# Patient Record
Sex: MALE | Race: WHITE | Employment: FULL TIME | URBAN - METROPOLITAN AREA
[De-identification: names, ages, dates, MRNs, and addresses within clinical notes are randomized per-mention and may not be internally consistent; named-entity substitution may affect disease eponyms.]

---

## 2017-09-26 ENCOUNTER — ANESTHESIA EVENT (OUTPATIENT)
Dept: OPERATING ROOM | Age: 37
End: 2017-09-26
Payer: COMMERCIAL

## 2017-09-26 RX ORDER — COVID-19 ANTIGEN TEST
KIT MISCELLANEOUS PRN
COMMUNITY

## 2017-09-26 RX ORDER — ALBUTEROL SULFATE 90 UG/1
2 AEROSOL, METERED RESPIRATORY (INHALATION) EVERY 6 HOURS PRN
COMMUNITY

## 2017-09-26 RX ORDER — IBUPROFEN 200 MG
400 TABLET ORAL EVERY 6 HOURS PRN
Status: ON HOLD | COMMUNITY
End: 2018-08-01 | Stop reason: HOSPADM

## 2017-09-27 ENCOUNTER — HOSPITAL ENCOUNTER (OUTPATIENT)
Age: 37
Setting detail: OUTPATIENT SURGERY
Discharge: HOME | End: 2017-09-27
Attending: OPHTHALMOLOGY | Admitting: OPHTHALMOLOGY
Payer: COMMERCIAL

## 2017-09-27 ENCOUNTER — ANESTHESIA (OUTPATIENT)
Dept: OPERATING ROOM | Age: 37
End: 2017-09-27
Payer: COMMERCIAL

## 2017-09-27 VITALS — TEMPERATURE: 98 F | DIASTOLIC BLOOD PRESSURE: 87 MMHG | OXYGEN SATURATION: 95 % | SYSTOLIC BLOOD PRESSURE: 141 MMHG

## 2017-09-27 VITALS
HEIGHT: 72 IN | HEART RATE: 88 BPM | TEMPERATURE: 97.3 F | DIASTOLIC BLOOD PRESSURE: 80 MMHG | SYSTOLIC BLOOD PRESSURE: 133 MMHG | WEIGHT: 260 LBS | BODY MASS INDEX: 35.21 KG/M2 | OXYGEN SATURATION: 94 % | RESPIRATION RATE: 18 BRPM

## 2017-09-27 LAB
EKG ATRIAL RATE: 64 BPM
EKG P AXIS: 37 DEGREES
EKG P-R INTERVAL: 172 MS
EKG Q-T INTERVAL: 370 MS
EKG QRS DURATION: 90 MS
EKG QTC CALCULATION (BAZETT): 381 MS
EKG R AXIS: 1 DEGREES
EKG T AXIS: 18 DEGREES
EKG VENTRICULAR RATE: 64 BPM

## 2017-09-27 PROCEDURE — 6370000000 HC RX 637 (ALT 250 FOR IP): Performed by: OPHTHALMOLOGY

## 2017-09-27 PROCEDURE — 6360000002 HC RX W HCPCS: Performed by: OPHTHALMOLOGY

## 2017-09-27 PROCEDURE — 6360000002 HC RX W HCPCS: Performed by: NURSE ANESTHETIST, CERTIFIED REGISTERED

## 2017-09-27 PROCEDURE — 7100000000 HC PACU RECOVERY - FIRST 15 MIN: Performed by: OPHTHALMOLOGY

## 2017-09-27 PROCEDURE — 3700000001 HC ADD 15 MINUTES (ANESTHESIA): Performed by: OPHTHALMOLOGY

## 2017-09-27 PROCEDURE — 2580000003 HC RX 258: Performed by: NURSE ANESTHETIST, CERTIFIED REGISTERED

## 2017-09-27 PROCEDURE — 7100000010 HC PHASE II RECOVERY - FIRST 15 MIN: Performed by: OPHTHALMOLOGY

## 2017-09-27 PROCEDURE — C1784 OCULAR DEV, INTRAOP, DET RET: HCPCS | Performed by: OPHTHALMOLOGY

## 2017-09-27 PROCEDURE — 3600000004 HC SURGERY LEVEL 4 BASE: Performed by: OPHTHALMOLOGY

## 2017-09-27 PROCEDURE — 3600000014 HC SURGERY LEVEL 4 ADDTL 15MIN: Performed by: OPHTHALMOLOGY

## 2017-09-27 PROCEDURE — 2580000003 HC RX 258: Performed by: ANESTHESIOLOGY

## 2017-09-27 PROCEDURE — 2780000010 HC IMPLANT OTHER: Performed by: OPHTHALMOLOGY

## 2017-09-27 PROCEDURE — 93005 ELECTROCARDIOGRAM TRACING: CPT

## 2017-09-27 PROCEDURE — 7100000001 HC PACU RECOVERY - ADDTL 15 MIN: Performed by: OPHTHALMOLOGY

## 2017-09-27 PROCEDURE — 7100000011 HC PHASE II RECOVERY - ADDTL 15 MIN: Performed by: OPHTHALMOLOGY

## 2017-09-27 PROCEDURE — 6370000000 HC RX 637 (ALT 250 FOR IP): Performed by: ANESTHESIOLOGY

## 2017-09-27 PROCEDURE — 2500000003 HC RX 250 WO HCPCS: Performed by: OPHTHALMOLOGY

## 2017-09-27 PROCEDURE — 3700000000 HC ANESTHESIA ATTENDED CARE: Performed by: OPHTHALMOLOGY

## 2017-09-27 PROCEDURE — 2500000003 HC RX 250 WO HCPCS: Performed by: NURSE ANESTHETIST, CERTIFIED REGISTERED

## 2017-09-27 DEVICE — KIT OPHTH 5ML PERFLUOROCARBON LIQ PROC PERFLUORON: Type: IMPLANTABLE DEVICE | Site: EYE | Status: FUNCTIONAL

## 2017-09-27 DEVICE — IMPL EYE SLV SCLERAL BCKL SIL 70 2.1MM: Type: IMPLANTABLE DEVICE | Site: EYE | Status: FUNCTIONAL

## 2017-09-27 DEVICE — STRIP SCLER W3.5XL125MM THK0.75MM SIL SLD STYL 41/S2970: Type: IMPLANTABLE DEVICE | Site: EYE | Status: FUNCTIONAL

## 2017-09-27 RX ORDER — ONDANSETRON 2 MG/ML
4 INJECTION INTRAMUSCULAR; INTRAVENOUS
Status: DISCONTINUED | OUTPATIENT
Start: 2017-09-27 | End: 2017-09-27 | Stop reason: HOSPADM

## 2017-09-27 RX ORDER — GLYCOPYRROLATE 0.2 MG/ML
INJECTION INTRAMUSCULAR; INTRAVENOUS PRN
Status: DISCONTINUED | OUTPATIENT
Start: 2017-09-27 | End: 2017-09-27 | Stop reason: SDUPTHER

## 2017-09-27 RX ORDER — SODIUM CHLORIDE, SODIUM LACTATE, POTASSIUM CHLORIDE, CALCIUM CHLORIDE 600; 310; 30; 20 MG/100ML; MG/100ML; MG/100ML; MG/100ML
INJECTION, SOLUTION INTRAVENOUS CONTINUOUS PRN
Status: DISCONTINUED | OUTPATIENT
Start: 2017-09-27 | End: 2017-09-27 | Stop reason: SDUPTHER

## 2017-09-27 RX ORDER — FENTANYL CITRATE 50 UG/ML
INJECTION, SOLUTION INTRAMUSCULAR; INTRAVENOUS PRN
Status: DISCONTINUED | OUTPATIENT
Start: 2017-09-27 | End: 2017-09-27 | Stop reason: SDUPTHER

## 2017-09-27 RX ORDER — BUPIVACAINE HYDROCHLORIDE 7.5 MG/ML
INJECTION, SOLUTION EPIDURAL; RETROBULBAR PRN
Status: DISCONTINUED | OUTPATIENT
Start: 2017-09-27 | End: 2017-09-27 | Stop reason: HOSPADM

## 2017-09-27 RX ORDER — CEFAZOLIN SODIUM 500 MG/2.2ML
INJECTION, POWDER, FOR SOLUTION INTRAMUSCULAR; INTRAVENOUS PRN
Status: DISCONTINUED | OUTPATIENT
Start: 2017-09-27 | End: 2017-09-27 | Stop reason: HOSPADM

## 2017-09-27 RX ORDER — IBUPROFEN 600 MG/1
600 TABLET ORAL
Status: COMPLETED | OUTPATIENT
Start: 2017-09-27 | End: 2017-09-27

## 2017-09-27 RX ORDER — ONDANSETRON 2 MG/ML
INJECTION INTRAMUSCULAR; INTRAVENOUS PRN
Status: DISCONTINUED | OUTPATIENT
Start: 2017-09-27 | End: 2017-09-27 | Stop reason: SDUPTHER

## 2017-09-27 RX ORDER — ROCURONIUM BROMIDE 10 MG/ML
INJECTION, SOLUTION INTRAVENOUS PRN
Status: DISCONTINUED | OUTPATIENT
Start: 2017-09-27 | End: 2017-09-27 | Stop reason: SDUPTHER

## 2017-09-27 RX ORDER — PROPOFOL 10 MG/ML
INJECTION, EMULSION INTRAVENOUS PRN
Status: DISCONTINUED | OUTPATIENT
Start: 2017-09-27 | End: 2017-09-27 | Stop reason: SDUPTHER

## 2017-09-27 RX ORDER — CYCLOPENTOLATE HYDROCHLORIDE 10 MG/ML
1 SOLUTION/ DROPS OPHTHALMIC EVERY 5 MIN PRN
Status: COMPLETED | OUTPATIENT
Start: 2017-09-27 | End: 2017-09-27

## 2017-09-27 RX ORDER — DEXAMETHASONE SODIUM PHOSPHATE 10 MG/ML
INJECTION INTRAMUSCULAR; INTRAVENOUS PRN
Status: DISCONTINUED | OUTPATIENT
Start: 2017-09-27 | End: 2017-09-27 | Stop reason: SDUPTHER

## 2017-09-27 RX ORDER — GENTAMICIN SULFATE 3 MG/ML
SOLUTION/ DROPS OPHTHALMIC PRN
Status: DISCONTINUED | OUTPATIENT
Start: 2017-09-27 | End: 2017-09-27 | Stop reason: HOSPADM

## 2017-09-27 RX ORDER — CYCLOPENTOLATE HYDROCHLORIDE 10 MG/ML
SOLUTION/ DROPS OPHTHALMIC PRN
Status: DISCONTINUED | OUTPATIENT
Start: 2017-09-27 | End: 2017-09-27 | Stop reason: HOSPADM

## 2017-09-27 RX ORDER — NEOSTIGMINE METHYLSULFATE 1 MG/ML
INJECTION, SOLUTION INTRAVENOUS PRN
Status: DISCONTINUED | OUTPATIENT
Start: 2017-09-27 | End: 2017-09-27 | Stop reason: SDUPTHER

## 2017-09-27 RX ORDER — BALANCED SALT SOLUTION ENRICHED WITH BICARBONATE, DEXTROSE, AND GLUTATHIONE
KIT INTRAOCULAR PRN
Status: DISCONTINUED | OUTPATIENT
Start: 2017-09-27 | End: 2017-09-27 | Stop reason: HOSPADM

## 2017-09-27 RX ORDER — DEXAMETHASONE SODIUM PHOSPHATE 4 MG/ML
INJECTION, SOLUTION INTRA-ARTICULAR; INTRALESIONAL; INTRAMUSCULAR; INTRAVENOUS; SOFT TISSUE PRN
Status: DISCONTINUED | OUTPATIENT
Start: 2017-09-27 | End: 2017-09-27 | Stop reason: HOSPADM

## 2017-09-27 RX ORDER — PROMETHAZINE HYDROCHLORIDE 25 MG/ML
6.25 INJECTION, SOLUTION INTRAMUSCULAR; INTRAVENOUS
Status: DISCONTINUED | OUTPATIENT
Start: 2017-09-27 | End: 2017-09-27 | Stop reason: HOSPADM

## 2017-09-27 RX ORDER — SODIUM CHLORIDE, SODIUM LACTATE, POTASSIUM CHLORIDE, CALCIUM CHLORIDE 600; 310; 30; 20 MG/100ML; MG/100ML; MG/100ML; MG/100ML
INJECTION, SOLUTION INTRAVENOUS CONTINUOUS
Status: DISCONTINUED | OUTPATIENT
Start: 2017-09-27 | End: 2017-09-27 | Stop reason: HOSPADM

## 2017-09-27 RX ORDER — LIDOCAINE HYDROCHLORIDE 10 MG/ML
1 INJECTION, SOLUTION EPIDURAL; INFILTRATION; INTRACAUDAL; PERINEURAL
Status: DISCONTINUED | OUTPATIENT
Start: 2017-09-27 | End: 2017-09-27 | Stop reason: HOSPADM

## 2017-09-27 RX ORDER — DIPHENHYDRAMINE HYDROCHLORIDE 50 MG/ML
12.5 INJECTION INTRAMUSCULAR; INTRAVENOUS
Status: DISCONTINUED | OUTPATIENT
Start: 2017-09-27 | End: 2017-09-27 | Stop reason: HOSPADM

## 2017-09-27 RX ORDER — PREDNISOLONE ACETATE 10 MG/ML
SUSPENSION/ DROPS OPHTHALMIC PRN
Status: DISCONTINUED | OUTPATIENT
Start: 2017-09-27 | End: 2017-09-27 | Stop reason: HOSPADM

## 2017-09-27 RX ORDER — LIDOCAINE HYDROCHLORIDE 20 MG/ML
INJECTION, SOLUTION INFILTRATION; PERINEURAL PRN
Status: DISCONTINUED | OUTPATIENT
Start: 2017-09-27 | End: 2017-09-27 | Stop reason: HOSPADM

## 2017-09-27 RX ORDER — LIDOCAINE HYDROCHLORIDE 10 MG/ML
INJECTION, SOLUTION EPIDURAL; INFILTRATION; INTRACAUDAL; PERINEURAL PRN
Status: DISCONTINUED | OUTPATIENT
Start: 2017-09-27 | End: 2017-09-27 | Stop reason: SDUPTHER

## 2017-09-27 RX ORDER — BRIMONIDINE TARTRATE 0.15 %
DROPS OPHTHALMIC (EYE) PRN
Status: DISCONTINUED | OUTPATIENT
Start: 2017-09-27 | End: 2017-09-27 | Stop reason: HOSPADM

## 2017-09-27 RX ORDER — ATROPINE SULFATE 10 MG/ML
1 SOLUTION/ DROPS OPHTHALMIC ONCE
Status: COMPLETED | OUTPATIENT
Start: 2017-09-27 | End: 2017-09-27

## 2017-09-27 RX ADMIN — LIDOCAINE HYDROCHLORIDE 100 MG: 10 INJECTION, SOLUTION EPIDURAL; INFILTRATION; INTRACAUDAL; PERINEURAL at 12:15

## 2017-09-27 RX ADMIN — SODIUM CHLORIDE, POTASSIUM CHLORIDE, SODIUM LACTATE AND CALCIUM CHLORIDE: 600; 310; 30; 20 INJECTION, SOLUTION INTRAVENOUS at 12:00

## 2017-09-27 RX ADMIN — CYCLOPENTOLATE HYDROCHLORIDE 1 DROP: 10 SOLUTION/ DROPS OPHTHALMIC at 08:39

## 2017-09-27 RX ADMIN — CYCLOPENTOLATE HYDROCHLORIDE 1 DROP: 10 SOLUTION/ DROPS OPHTHALMIC at 08:25

## 2017-09-27 RX ADMIN — IBUPROFEN 600 MG: 600 TABLET, FILM COATED ORAL at 13:37

## 2017-09-27 RX ADMIN — GLYCOPYRROLATE 0.2 MG: 0.2 INJECTION, SOLUTION INTRAMUSCULAR; INTRAVENOUS at 09:32

## 2017-09-27 RX ADMIN — FENTANYL CITRATE 50 MCG: 50 INJECTION INTRAMUSCULAR; INTRAVENOUS at 09:25

## 2017-09-27 RX ADMIN — ROCURONIUM BROMIDE 20 MG: 50 INJECTION, SOLUTION INTRAVENOUS at 11:15

## 2017-09-27 RX ADMIN — GENTAMICIN, PREDNISOLONE ACETATE 1 DROP: 3; 10 SUSPENSION/ DROPS OPHTHALMIC at 08:39

## 2017-09-27 RX ADMIN — CYCLOPENTOLATE HYDROCHLORIDE 1 DROP: 10 SOLUTION/ DROPS OPHTHALMIC at 08:32

## 2017-09-27 RX ADMIN — LIDOCAINE HYDROCHLORIDE 50 MG: 10 INJECTION, SOLUTION EPIDURAL; INFILTRATION; INTRACAUDAL; PERINEURAL at 09:27

## 2017-09-27 RX ADMIN — ONDANSETRON 4 MG: 2 INJECTION INTRAMUSCULAR; INTRAVENOUS at 11:56

## 2017-09-27 RX ADMIN — PROPOFOL 300 MG: 10 INJECTION, EMULSION INTRAVENOUS at 09:27

## 2017-09-27 RX ADMIN — SODIUM CHLORIDE, POTASSIUM CHLORIDE, SODIUM LACTATE AND CALCIUM CHLORIDE: 600; 310; 30; 20 INJECTION, SOLUTION INTRAVENOUS at 08:45

## 2017-09-27 RX ADMIN — GLYCOPYRROLATE 0.4 MG: 0.2 INJECTION, SOLUTION INTRAMUSCULAR; INTRAVENOUS at 12:15

## 2017-09-27 RX ADMIN — ATROPINE SULFATE 1 DROP: 10 SOLUTION/ DROPS OPHTHALMIC at 08:25

## 2017-09-27 RX ADMIN — ROCURONIUM BROMIDE 50 MG: 50 INJECTION, SOLUTION INTRAVENOUS at 09:28

## 2017-09-27 RX ADMIN — DEXAMETHASONE SODIUM PHOSPHATE 10 MG: 10 INJECTION INTRAMUSCULAR; INTRAVENOUS at 09:35

## 2017-09-27 RX ADMIN — NEOSTIGMINE METHYLSULFATE 2 MG: 1 INJECTION INTRAVENOUS at 12:15

## 2017-09-27 RX ADMIN — FENTANYL CITRATE 50 MCG: 50 INJECTION INTRAMUSCULAR; INTRAVENOUS at 09:40

## 2017-09-27 RX ADMIN — ROCURONIUM BROMIDE 20 MG: 50 INJECTION, SOLUTION INTRAVENOUS at 09:59

## 2017-09-27 ASSESSMENT — PAIN - FUNCTIONAL ASSESSMENT: PAIN_FUNCTIONAL_ASSESSMENT: 0-10

## 2017-09-27 ASSESSMENT — PAIN SCALES - GENERAL
PAINLEVEL_OUTOF10: 0
PAINLEVEL_OUTOF10: 6

## 2017-09-27 ASSESSMENT — ENCOUNTER SYMPTOMS: SHORTNESS OF BREATH: 0

## 2017-09-27 NOTE — IP AVS SNAPSHOT
Patient Information     Patient Name SAIMA Guan [place] 1980         This is your updated medication list to keep with you all times      TAKE these medications     albuterol sulfate  (90 Base) MCG/ACT inhaler       ALEVE 220 MG Caps   Generic drug:  Naproxen Sodium       ibuprofen 200 MG tablet   Commonly known as:  ADVIL;MOTRIN

## 2017-09-27 NOTE — BRIEF OP NOTE
Brief Postoperative Note  ______________________________________________________________    Patient: Mihaela Monsalve  YOB: 1980  MRN: [id number]  Date of Procedure: 9/27/2017    Pre-Op Diagnosis: RETINAL DETACHMENT LEFT EYE     Post-Op Diagnosis: Same       Procedure(s):  VITRECTOMY 23 GAUGE, ENDOLASER 200MW 200MS 1285 SPOTS, SCLERAL BUCKLE, AIR FLUID AIR GAS EXCHANGE WITH 16 PERCENT C3F8, PFO    Anesthesia: General    Surgeon(s):  Erasmo Potter MD    Staff:  Scrub Person First: Cheryl Watts  Scrub Person Second: Alycia Wells RN     Estimated Blood Loss none    Complications: none    Specimens:   * No specimens in log *    Implants:    Implant Name Type Inv. Item Serial No.  Lot No. LRB No. Used   KIT PROC LIQ OCULAR PERFLOURON 5ML US Eye KIT PROC LIQ OCULAR PERFLOURON 5ML US  ISABEL  Left 1   COMPONENT SCLERAL BUCKLE  STYLE 41 Eye COMPONENT SCLERAL BUCKLE  STYLE 41  [place] OPTHALMIC [place]  Left 1   IMPL EYE SLV SCLERAL BCKL ALLEN 70 2. [address] BCKL ALLEN 70 2.1MM   [address] [facility]   Left 1             Erasmo Potter MD  Date: 9/27/2017  Time: 12:24 PM

## 2017-09-27 NOTE — IP AVS SNAPSHOT
After Visit Summary  (Discharge Instructions)    Medication List for Home    Based on the information you provided to us as well as any changes during this visit, the following is your updated medication list.  Compare this with your prescription bottles at home. If you have any questions or concerns, contact your primary care physician's office. Daily Medication List (This medication list can be shared with any healthcare provider who is helping you manage your medications)      These are medications you told us you were taking at home, CONTINUE taking them after you leave the hospital        Last Dose    Next Dose Due AM NOON PM NIGHT    albuterol sulfate  (90 Base) MCG/ACT inhaler   Inhale 2 puffs into the lungs every 6 hours as needed for Wheezing                                         ALEVE 220 MG Caps   Generic drug:  Naproxen Sodium   Take by mouth as needed for Pain                                         ibuprofen 200 MG tablet   Commonly known as:  ADVIL;MOTRIN   Take 400 mg by mouth every 6 hours as needed for Pain                                                 Allergies as of 9/27/2017     No Known Allergies      Immunizations as of 9/27/2017     No immunizations on file. Last Vitals          Most Recent Value    Temp      Pulse  68    Resp  16    BP  (!)  151/92         After Visit Summary    This summary was created for you. Thank you for entrusting your care to us.   The following information includes details about your hospital/visit stay along with steps you should take to help with your recovery once you leave the hospital.  In this packet, you will find information about the topics listed below:    · Instructions about your medications including a list of your home medications  · A summary of your hospital visit  · Follow-up appointments once you have left the hospital  · Your care plan at home changed, so think of one that is secure and easy to remember. 6. Create a [employer] password. You can change your password at any time. 7. Enter your Password Reset Question and Answer. This can be used at a later time if you forget your password. 8. Enter your e-mail address. You will receive e-mail notification when new information is available in [address] 9. Click Sign Up. You can now view your medical record. Additional Information  If you have questions, please contact the physician practice where you receive care. Remember, [employer] is NOT to be used for urgent needs. For medical emergencies, dial 911. For questions regarding your [employer] account call [phone]. If you have a clinical question, please call your doctor's office. View your information online  ? Review your current list of  medications, immunization, and allergies. ? Review your future test results online . ? Review your discharge instructions provided by your caregivers at discharge    Certain functionality such as prescription refills, scheduling appointments or sending messages to your provider are not activated if your provider does not use [employer] in his/her office    For questions regarding your [employer] account call [phone]. If you have a clinical question, please call your doctor's office. The information on all pages of the After Visit Summary has been reviewed with me, the patient and/or responsible adult, by my health care provider(s). I had the opportunity to ask questions regarding this information. I understand I should dispose of my armband safely at home to protect my health information. A complete copy of the After Visit Summary has been given to me, the patient and/or responsible adult.            Patient Signature/Responsible Adult:____________________    Clinician Signature:_____________________    Date:_____________________    Time:_____________________

## 2017-10-02 NOTE — OP NOTE
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 The attending surgeon, Dr. Joe Chan, identified the patient in the preoperative holding area, and the appropriate operative site was marked. Patient was then brought back to the operating room. Verbal time-out was called and subsequently general anesthesia was induced. The patient was then prepped and draped in the usual ophthalmic fashion. A lid speculum was placed into the surgical eye. A 360-degree conjunctival peritomy was performed with Vicenta scissors. Blunt dissection was carried out in each of the oblique quadrants with curved Montague scissors. Each of the rectus muscles were isolated with muscle hooks and subsequently isolated with 2-0 silk ligatures. At this time, the sclera was inspected and no areas of scleral thinning were visualized. At this time, the 41 encircling silicone band was brought onto the field. It had been previously soaked in Ancef and was looped under each of the rectus muscles. It was fastened in the superonasal quadrant with the Watzke sleeve. The 5-0 nylon sutures were used to secure the band in each of the oblique quadrants in the typical horizontal mattress fashion. The anterior bite of the nylon suture was placed 3.5 posterior to the muscle insertion. The band was then brought up to an appropriate level of tension. The superior excess of the encircling band was placed underneath the superior rectus muscle, and the inferior excess was placed underneath the band.

## 2018-08-01 ENCOUNTER — ANESTHESIA EVENT (OUTPATIENT)
Dept: OPERATING ROOM | Age: 38
End: 2018-08-01
Payer: COMMERCIAL

## 2018-08-01 ENCOUNTER — ANESTHESIA (OUTPATIENT)
Dept: OPERATING ROOM | Age: 38
End: 2018-08-01
Payer: COMMERCIAL

## 2018-08-01 ENCOUNTER — OFFICE VISIT (OUTPATIENT)
Dept: PRIMARY CARE CLINIC | Age: 38
End: 2018-08-01
Payer: COMMERCIAL

## 2018-08-01 ENCOUNTER — HOSPITAL ENCOUNTER (OUTPATIENT)
Age: 38
Discharge: HOME | End: 2018-08-03
Payer: COMMERCIAL

## 2018-08-01 ENCOUNTER — HOSPITAL ENCOUNTER (EMERGENCY)
Age: 38
Discharge: HOME | End: 2018-08-02
Attending: SPECIALIST | Admitting: SURGERY
Payer: COMMERCIAL

## 2018-08-01 ENCOUNTER — HOSPITAL ENCOUNTER (OUTPATIENT)
Dept: CT IMAGING | Age: 38
Discharge: HOME | End: 2018-08-03
Payer: COMMERCIAL

## 2018-08-01 ENCOUNTER — HOSPITAL ENCOUNTER (OUTPATIENT)
Age: 38
Setting detail: SPECIMEN
Discharge: HOME | End: 2018-08-01
Payer: COMMERCIAL

## 2018-08-01 VITALS
RESPIRATION RATE: 17 BRPM | OXYGEN SATURATION: 99 % | SYSTOLIC BLOOD PRESSURE: 108 MMHG | TEMPERATURE: 98 F | DIASTOLIC BLOOD PRESSURE: 62 MMHG

## 2018-08-01 VITALS
WEIGHT: 264 LBS | HEART RATE: 74 BPM | DIASTOLIC BLOOD PRESSURE: 74 MMHG | SYSTOLIC BLOOD PRESSURE: 132 MMHG | TEMPERATURE: 98 F | OXYGEN SATURATION: 98 % | BODY MASS INDEX: 35.8 KG/M2

## 2018-08-01 DIAGNOSIS — K35.30 ACUTE APPENDICITIS WITH LOCALIZED PERITONITIS: Primary | ICD-10-CM

## 2018-08-01 DIAGNOSIS — K35.80 ACUTE APPENDICITIS, UNSPECIFIED ACUTE APPENDICITIS TYPE: Primary | ICD-10-CM

## 2018-08-01 DIAGNOSIS — R10.84 GENERALIZED ABDOMINAL PAIN: ICD-10-CM

## 2018-08-01 DIAGNOSIS — R91.1 LUNG NODULE SEEN ON IMAGING STUDY: ICD-10-CM

## 2018-08-01 LAB
-: NORMAL
ABSOLUTE EOS #: 0.1 K/UL (ref 0–0.4)
ABSOLUTE IMMATURE GRANULOCYTE: ABNORMAL K/UL (ref 0–0.3)
ABSOLUTE LYMPH #: 2 K/UL (ref 1–4.8)
ABSOLUTE MONO #: 1.2 K/UL (ref 0.1–1.2)
ALBUMIN SERPL-MCNC: 4.5 G/DL (ref 3.5–5.2)
ALBUMIN/GLOBULIN RATIO: 1.4 (ref 1–2.5)
ALP BLD-CCNC: 61 U/L (ref 40–129)
ALT SERPL-CCNC: 39 U/L (ref 5–41)
AMORPHOUS: NORMAL
ANION GAP SERPL CALCULATED.3IONS-SCNC: 12 MMOL/L (ref 9–17)
AST SERPL-CCNC: 32 U/L
BACTERIA: NORMAL
BASOPHILS # BLD: 1 % (ref 0–2)
BASOPHILS ABSOLUTE: 0.1 K/UL (ref 0–0.2)
BILIRUB SERPL-MCNC: 0.29 MG/DL (ref 0.3–1.2)
BILIRUBIN URINE: NEGATIVE
BUN BLDV-MCNC: 17 MG/DL (ref 6–20)
BUN/CREAT BLD: 17 (ref 9–20)
CALCIUM SERPL-MCNC: 9.4 MG/DL (ref 8.6–10.4)
CASTS UA: NORMAL /LPF (ref 0–2)
CHLORIDE BLD-SCNC: 99 MMOL/L (ref 98–107)
CO2: 28 MMOL/L (ref 20–31)
COLOR: NORMAL
COMMENT UA: NORMAL
CREAT SERPL-MCNC: 1 MG/DL (ref 0.7–1.2)
CRYSTALS, UA: NORMAL /HPF
DIFFERENTIAL TYPE: ABNORMAL
EOSINOPHILS RELATIVE PERCENT: 1 % (ref 1–8)
EPITHELIAL CELLS UA: NORMAL /HPF (ref 0–5)
GFR AFRICAN AMERICAN: >60 ML/MIN
GFR NON-AFRICAN AMERICAN: >60 ML/MIN
GFR SERPL CREATININE-BSD FRML MDRD: ABNORMAL ML/MIN/{1.73_M2}
GFR SERPL CREATININE-BSD FRML MDRD: ABNORMAL ML/MIN/{1.73_M2}
GLUCOSE BLD-MCNC: 98 MG/DL (ref 70–99)
GLUCOSE URINE: NEGATIVE
HCT VFR BLD CALC: 42.9 % (ref 41–53)
HEMOGLOBIN: 14.8 G/DL (ref 13.5–17.5)
IMMATURE GRANULOCYTES: ABNORMAL %
KETONES, URINE: NEGATIVE
LEUKOCYTE ESTERASE, URINE: NEGATIVE
LYMPHOCYTES # BLD: 13 % (ref 15–43)
MCH RBC QN AUTO: 30.4 PG (ref 26–34)
MCHC RBC AUTO-ENTMCNC: 34.5 G/DL (ref 31–37)
MCV RBC AUTO: 88.2 FL (ref 80–100)
MONOCYTES # BLD: 8 % (ref 6–14)
MUCUS: NORMAL
NITRITE, URINE: NEGATIVE
NRBC AUTOMATED: ABNORMAL PER 100 WBC
OTHER OBSERVATIONS UA: NORMAL
PDW BLD-RTO: 13.9 % (ref 11–14.5)
PH UA: 6 (ref 5–6)
PLATELET # BLD: 214 K/UL (ref 140–450)
PLATELET ESTIMATE: ABNORMAL
PMV BLD AUTO: 8.6 FL (ref 6–12)
POTASSIUM SERPL-SCNC: 3.9 MMOL/L (ref 3.7–5.3)
PROTEIN UA: NEGATIVE
RBC # BLD: 4.86 M/UL (ref 4.5–5.9)
RBC # BLD: ABNORMAL 10*6/UL
RBC UA: NORMAL /HPF (ref 0–4)
RENAL EPITHELIAL, UA: NORMAL /HPF
SEG NEUTROPHILS: 77 % (ref 44–74)
SEGMENTED NEUTROPHILS ABSOLUTE COUNT: 11.5 K/UL (ref 1.8–7.7)
SODIUM BLD-SCNC: 139 MMOL/L (ref 135–144)
SPECIFIC GRAVITY UA: 1.02 (ref 1.01–1.02)
TOTAL PROTEIN: 7.8 G/DL (ref 6.4–8.3)
TRICHOMONAS: NORMAL
TURBIDITY: NORMAL
URINE HGB: NEGATIVE
UROBILINOGEN, URINE: NORMAL
WBC # BLD: 15 K/UL (ref 3.5–11)
WBC # BLD: ABNORMAL 10*3/UL
WBC UA: NORMAL /HPF (ref 0–4)
YEAST: NORMAL

## 2018-08-01 PROCEDURE — 2709999900 CT ABDOMEN PELVIS W IV CONTRAST

## 2018-08-01 PROCEDURE — 7100000000 HC PACU RECOVERY - FIRST 15 MIN: Performed by: SURGERY

## 2018-08-01 PROCEDURE — 00790 ANES IPER UPR ABD NOS: CPT | Performed by: NURSE ANESTHETIST, CERTIFIED REGISTERED

## 2018-08-01 PROCEDURE — 2500000003 HC RX 250 WO HCPCS: Performed by: SURGERY

## 2018-08-01 PROCEDURE — 99215 OFFICE O/P EST HI 40 MIN: CPT | Performed by: NURSE PRACTITIONER

## 2018-08-01 PROCEDURE — 2500000003 HC RX 250 WO HCPCS

## 2018-08-01 PROCEDURE — 3600000004 HC SURGERY LEVEL 4 BASE: Performed by: SURGERY

## 2018-08-01 PROCEDURE — 2780000010 HC IMPLANT OTHER: Performed by: SURGERY

## 2018-08-01 PROCEDURE — S0028 INJECTION, FAMOTIDINE, 20 MG: HCPCS

## 2018-08-01 PROCEDURE — 7100000001 HC PACU RECOVERY - ADDTL 15 MIN: Performed by: SURGERY

## 2018-08-01 PROCEDURE — 2580000003 HC RX 258: Performed by: NURSE ANESTHETIST, CERTIFIED REGISTERED

## 2018-08-01 PROCEDURE — 3700000001 HC ADD 15 MINUTES (ANESTHESIA): Performed by: SURGERY

## 2018-08-01 PROCEDURE — 36415 COLL VENOUS BLD VENIPUNCTURE: CPT

## 2018-08-01 PROCEDURE — 6360000004 HC RX CONTRAST MEDICATION: Performed by: NURSE PRACTITIONER

## 2018-08-01 PROCEDURE — 88342 IMHCHEM/IMCYTCHM 1ST ANTB: CPT

## 2018-08-01 PROCEDURE — 6360000002 HC RX W HCPCS

## 2018-08-01 PROCEDURE — 2709999900 HC NON-CHARGEABLE SUPPLY: Performed by: SURGERY

## 2018-08-01 PROCEDURE — 44970 LAPAROSCOPY APPENDECTOMY: CPT | Performed by: SURGERY

## 2018-08-01 PROCEDURE — 99284 EMERGENCY DEPT VISIT MOD MDM: CPT

## 2018-08-01 PROCEDURE — 2720000010 HC SURG SUPPLY STERILE: Performed by: SURGERY

## 2018-08-01 PROCEDURE — 2500000003 HC RX 250 WO HCPCS: Performed by: NURSE ANESTHETIST, CERTIFIED REGISTERED

## 2018-08-01 PROCEDURE — 3600000014 HC SURGERY LEVEL 4 ADDTL 15MIN: Performed by: SURGERY

## 2018-08-01 PROCEDURE — 81001 URINALYSIS AUTO W/SCOPE: CPT

## 2018-08-01 PROCEDURE — 88304 TISSUE EXAM BY PATHOLOGIST: CPT

## 2018-08-01 PROCEDURE — S0028 INJECTION, FAMOTIDINE, 20 MG: HCPCS | Performed by: NURSE ANESTHETIST, CERTIFIED REGISTERED

## 2018-08-01 PROCEDURE — 3700000000 HC ANESTHESIA ATTENDED CARE: Performed by: SURGERY

## 2018-08-01 PROCEDURE — 2580000003 HC RX 258: Performed by: EMERGENCY MEDICINE

## 2018-08-01 PROCEDURE — 6360000002 HC RX W HCPCS: Performed by: NURSE ANESTHETIST, CERTIFIED REGISTERED

## 2018-08-01 PROCEDURE — 85025 COMPLETE CBC W/AUTO DIFF WBC: CPT

## 2018-08-01 PROCEDURE — 80053 COMPREHEN METABOLIC PANEL: CPT

## 2018-08-01 PROCEDURE — 88341 IMHCHEM/IMCYTCHM EA ADD ANTB: CPT

## 2018-08-01 DEVICE — RELOAD STPL H1-2.5X45MM VASC THN TISS WHT 6 ROW B FRM SGL: Type: IMPLANTABLE DEVICE | Status: FUNCTIONAL

## 2018-08-01 DEVICE — RELOAD STPL SZ 0 L45MM DIA3.5MM 0DEG STD REG TISS BLU TI: Type: IMPLANTABLE DEVICE | Status: FUNCTIONAL

## 2018-08-01 RX ORDER — HYDROCODONE BITARTRATE AND ACETAMINOPHEN 5; 325 MG/1; MG/1
1 TABLET ORAL EVERY 6 HOURS PRN
Qty: 20 TABLET | Refills: 0 | Status: SHIPPED | OUTPATIENT
Start: 2018-08-01 | End: 2018-08-08

## 2018-08-01 RX ORDER — MORPHINE SULFATE 2 MG/ML
1 INJECTION, SOLUTION INTRAMUSCULAR; INTRAVENOUS EVERY 5 MIN PRN
Status: DISCONTINUED | OUTPATIENT
Start: 2018-08-01 | End: 2018-09-04 | Stop reason: HOSPADM

## 2018-08-01 RX ORDER — PROPOFOL 10 MG/ML
INJECTION, EMULSION INTRAVENOUS PRN
Status: DISCONTINUED | OUTPATIENT
Start: 2018-08-01 | End: 2018-08-01 | Stop reason: SDUPTHER

## 2018-08-01 RX ORDER — ROCURONIUM BROMIDE 10 MG/ML
INJECTION, SOLUTION INTRAVENOUS PRN
Status: DISCONTINUED | OUTPATIENT
Start: 2018-08-01 | End: 2018-08-01 | Stop reason: SDUPTHER

## 2018-08-01 RX ORDER — HYDROCODONE BITARTRATE AND ACETAMINOPHEN 5; 325 MG/1; MG/1
1 TABLET ORAL PRN
Status: ACTIVE | OUTPATIENT
Start: 2018-08-01 | End: 2018-08-01

## 2018-08-01 RX ORDER — CEFOXITIN 2 G/1
INJECTION, POWDER, FOR SOLUTION INTRAVENOUS PRN
Status: DISCONTINUED | OUTPATIENT
Start: 2018-08-01 | End: 2018-08-01 | Stop reason: SDUPTHER

## 2018-08-01 RX ORDER — MORPHINE SULFATE 2 MG/ML
2 INJECTION, SOLUTION INTRAMUSCULAR; INTRAVENOUS EVERY 5 MIN PRN
Status: DISCONTINUED | OUTPATIENT
Start: 2018-08-01 | End: 2018-09-04 | Stop reason: HOSPADM

## 2018-08-01 RX ORDER — LIDOCAINE HYDROCHLORIDE 20 MG/ML
INJECTION, SOLUTION INFILTRATION; PERINEURAL PRN
Status: DISCONTINUED | OUTPATIENT
Start: 2018-08-01 | End: 2018-08-01 | Stop reason: SDUPTHER

## 2018-08-01 RX ORDER — MIDAZOLAM HYDROCHLORIDE 1 MG/ML
INJECTION INTRAMUSCULAR; INTRAVENOUS PRN
Status: DISCONTINUED | OUTPATIENT
Start: 2018-08-01 | End: 2018-08-01 | Stop reason: SDUPTHER

## 2018-08-01 RX ORDER — SUCCINYLCHOLINE CHLORIDE 20 MG/ML
INJECTION INTRAMUSCULAR; INTRAVENOUS PRN
Status: DISCONTINUED | OUTPATIENT
Start: 2018-08-01 | End: 2018-08-01 | Stop reason: SDUPTHER

## 2018-08-01 RX ORDER — HYDROCODONE BITARTRATE AND ACETAMINOPHEN 5; 325 MG/1; MG/1
2 TABLET ORAL PRN
Status: ACTIVE | OUTPATIENT
Start: 2018-08-01 | End: 2018-08-01

## 2018-08-01 RX ORDER — METOCLOPRAMIDE HYDROCHLORIDE 5 MG/ML
INJECTION INTRAMUSCULAR; INTRAVENOUS PRN
Status: DISCONTINUED | OUTPATIENT
Start: 2018-08-01 | End: 2018-08-01 | Stop reason: SDUPTHER

## 2018-08-01 RX ORDER — DEXAMETHASONE SODIUM PHOSPHATE 10 MG/ML
INJECTION INTRAMUSCULAR; INTRAVENOUS PRN
Status: DISCONTINUED | OUTPATIENT
Start: 2018-08-01 | End: 2018-08-01 | Stop reason: SDUPTHER

## 2018-08-01 RX ORDER — KETOROLAC TROMETHAMINE 30 MG/ML
INJECTION, SOLUTION INTRAMUSCULAR; INTRAVENOUS PRN
Status: DISCONTINUED | OUTPATIENT
Start: 2018-08-01 | End: 2018-08-01 | Stop reason: SDUPTHER

## 2018-08-01 RX ORDER — SODIUM CHLORIDE 9 MG/ML
INJECTION, SOLUTION INTRAVENOUS CONTINUOUS
Status: DISCONTINUED | OUTPATIENT
Start: 2018-08-01 | End: 2018-09-04 | Stop reason: HOSPADM

## 2018-08-01 RX ORDER — FENTANYL CITRATE 50 UG/ML
INJECTION, SOLUTION INTRAMUSCULAR; INTRAVENOUS PRN
Status: DISCONTINUED | OUTPATIENT
Start: 2018-08-01 | End: 2018-08-01 | Stop reason: SDUPTHER

## 2018-08-01 RX ORDER — ONDANSETRON 2 MG/ML
4 INJECTION INTRAMUSCULAR; INTRAVENOUS
Status: ACTIVE | OUTPATIENT
Start: 2018-08-01 | End: 2018-08-01

## 2018-08-01 RX ORDER — SODIUM CHLORIDE, SODIUM LACTATE, POTASSIUM CHLORIDE, CALCIUM CHLORIDE 600; 310; 30; 20 MG/100ML; MG/100ML; MG/100ML; MG/100ML
INJECTION, SOLUTION INTRAVENOUS CONTINUOUS PRN
Status: DISCONTINUED | OUTPATIENT
Start: 2018-08-01 | End: 2018-08-01 | Stop reason: SDUPTHER

## 2018-08-01 RX ADMIN — SUGAMMADEX 200 MG: 100 INJECTION, SOLUTION INTRAVENOUS at 22:54

## 2018-08-01 RX ADMIN — FAMOTIDINE 20 MG: 10 INJECTION, SOLUTION INTRAVENOUS at 21:45

## 2018-08-01 RX ADMIN — IOHEXOL 50 ML: 240 INJECTION, SOLUTION INTRATHECAL; INTRAVASCULAR; INTRAVENOUS; ORAL at 17:35

## 2018-08-01 RX ADMIN — ROCURONIUM BROMIDE 50 MG: 10 INJECTION INTRAVENOUS at 22:11

## 2018-08-01 RX ADMIN — MIDAZOLAM HYDROCHLORIDE 2 MG: 1 INJECTION, SOLUTION INTRAMUSCULAR; INTRAVENOUS at 21:55

## 2018-08-01 RX ADMIN — METOCLOPRAMIDE 10 MG: 5 INJECTION, SOLUTION INTRAMUSCULAR; INTRAVENOUS at 21:45

## 2018-08-01 RX ADMIN — FENTANYL CITRATE 25 MCG: 50 INJECTION, SOLUTION INTRAMUSCULAR; INTRAVENOUS at 23:07

## 2018-08-01 RX ADMIN — PROPOFOL 200 MG: 10 INJECTION, EMULSION INTRAVENOUS at 22:00

## 2018-08-01 RX ADMIN — SUCCINYLCHOLINE CHLORIDE 180 MG: 20 INJECTION, SOLUTION INTRAMUSCULAR; INTRAVENOUS at 22:00

## 2018-08-01 RX ADMIN — FENTANYL CITRATE 50 MCG: 50 INJECTION, SOLUTION INTRAMUSCULAR; INTRAVENOUS at 22:11

## 2018-08-01 RX ADMIN — CEFOXITIN SODIUM 2 G: 2 POWDER, FOR SOLUTION INTRAVENOUS at 22:02

## 2018-08-01 RX ADMIN — DEXAMETHASONE SODIUM PHOSPHATE 10 MG: 10 INJECTION INTRAMUSCULAR; INTRAVENOUS at 22:11

## 2018-08-01 RX ADMIN — FENTANYL CITRATE 25 MCG: 50 INJECTION, SOLUTION INTRAMUSCULAR; INTRAVENOUS at 22:59

## 2018-08-01 RX ADMIN — SODIUM CHLORIDE, POTASSIUM CHLORIDE, SODIUM LACTATE AND CALCIUM CHLORIDE: 600; 310; 30; 20 INJECTION, SOLUTION INTRAVENOUS at 22:50

## 2018-08-01 RX ADMIN — FENTANYL CITRATE 50 MCG: 50 INJECTION, SOLUTION INTRAMUSCULAR; INTRAVENOUS at 22:00

## 2018-08-01 RX ADMIN — LIDOCAINE HYDROCHLORIDE 100 MG: 20 INJECTION, SOLUTION INFILTRATION; PERINEURAL at 22:00

## 2018-08-01 RX ADMIN — IOPAMIDOL 100 ML: 755 INJECTION, SOLUTION INTRAVENOUS at 18:34

## 2018-08-01 RX ADMIN — FENTANYL CITRATE 25 MCG: 50 INJECTION, SOLUTION INTRAMUSCULAR; INTRAVENOUS at 22:52

## 2018-08-01 RX ADMIN — SODIUM CHLORIDE: 9 INJECTION, SOLUTION INTRAVENOUS at 20:18

## 2018-08-01 RX ADMIN — FENTANYL CITRATE 25 MCG: 50 INJECTION, SOLUTION INTRAMUSCULAR; INTRAVENOUS at 22:57

## 2018-08-01 RX ADMIN — KETOROLAC TROMETHAMINE 30 MG: 30 INJECTION, SOLUTION INTRAMUSCULAR at 22:53

## 2018-08-01 ASSESSMENT — PAIN DESCRIPTION - LOCATION: LOCATION: ABDOMEN

## 2018-08-01 ASSESSMENT — ENCOUNTER SYMPTOMS
ABDOMINAL PAIN: 1
CONSTIPATION: 0
DIARRHEA: 0
SHORTNESS OF BREATH: 0
NAUSEA: 0
ABDOMINAL PAIN: 1
CHEST TIGHTNESS: 0
DIARRHEA: 0
COUGH: 0
VOMITING: 0
SHORTNESS OF BREATH: 0
NAUSEA: 0
VOMITING: 0
COUGH: 0
ALLERGIC/IMMUNOLOGIC NEGATIVE: 1
TROUBLE SWALLOWING: 0
WHEEZING: 0
BACK PAIN: 0
EYES NEGATIVE: 1
SINUS PRESSURE: 0
CONSTIPATION: 0

## 2018-08-01 ASSESSMENT — PULMONARY FUNCTION TESTS
PIF_VALUE: 11
PIF_VALUE: 18
PIF_VALUE: 26
PIF_VALUE: 25
PIF_VALUE: 19
PIF_VALUE: 6
PIF_VALUE: 1
PIF_VALUE: 27
PIF_VALUE: 27
PIF_VALUE: 6
PIF_VALUE: 17
PIF_VALUE: 12
PIF_VALUE: 26
PIF_VALUE: 26
PIF_VALUE: 18
PIF_VALUE: 18
PIF_VALUE: 27
PIF_VALUE: 2
PIF_VALUE: 26
PIF_VALUE: 18
PIF_VALUE: 17
PIF_VALUE: 28
PIF_VALUE: 20
PIF_VALUE: 26
PIF_VALUE: 18
PIF_VALUE: 12
PIF_VALUE: 25
PIF_VALUE: 3
PIF_VALUE: 25
PIF_VALUE: 2
PIF_VALUE: 28
PIF_VALUE: 26
PIF_VALUE: 25
PIF_VALUE: 2
PIF_VALUE: 25
PIF_VALUE: 6
PIF_VALUE: 19
PIF_VALUE: 26
PIF_VALUE: 19
PIF_VALUE: 26
PIF_VALUE: 17
PIF_VALUE: 8
PIF_VALUE: 1
PIF_VALUE: 25
PIF_VALUE: 19
PIF_VALUE: 18
PIF_VALUE: 27
PIF_VALUE: 2
PIF_VALUE: 19
PIF_VALUE: 2
PIF_VALUE: 18
PIF_VALUE: 19
PIF_VALUE: 1
PIF_VALUE: 27
PIF_VALUE: 27
PIF_VALUE: 19
PIF_VALUE: 3
PIF_VALUE: 20
PIF_VALUE: 27
PIF_VALUE: 26
PIF_VALUE: 25
PIF_VALUE: 2
PIF_VALUE: 8
PIF_VALUE: 18
PIF_VALUE: 26
PIF_VALUE: 6
PIF_VALUE: 17
PIF_VALUE: 27
PIF_VALUE: 21

## 2018-08-01 ASSESSMENT — PAIN SCALES - GENERAL
PAINLEVEL_OUTOF10: 0
PAINLEVEL_OUTOF10: 2

## 2018-08-01 ASSESSMENT — PAIN DESCRIPTION - PAIN TYPE: TYPE: ACUTE PAIN

## 2018-08-01 ASSESSMENT — PAIN DESCRIPTION - FREQUENCY: FREQUENCY: INTERMITTENT

## 2018-08-01 ASSESSMENT — PAIN DESCRIPTION - DESCRIPTORS: DESCRIPTORS: CONSTANT;ACHING

## 2018-08-01 ASSESSMENT — PAIN DESCRIPTION - ORIENTATION: ORIENTATION: RIGHT;LOWER

## 2018-08-01 NOTE — PROGRESS NOTES
[address] [facility]  [address], [address] [place]  [phone]      [address] is a 34 y.o. male who is c/o of Abdominal Pain (RUQ getting worse during day, no nausea )      HPI:     HPI Patient in today for an acute visit for symptoms of RUQ ABD pain that started this morning. Get worse as the day goes on. States that he has Right abdominal pain. He states that he is not taking anything right now. He states that it started this morning. He states that he has raw fruits and veggies for breakfast and shredded beef sandwich for lunch. Denies heartburn. States that he has had 3 Bowel movements today. States that he has a history of kidney stones that had to be removed. Subjective:      Review of Systems   Constitutional: Negative for activity change, appetite change and fever. HENT: Negative for congestion, ear pain, sinus pressure and trouble swallowing. Eyes: Negative. Respiratory: Negative for cough, chest tightness and shortness of breath. Cardiovascular: Negative for chest pain and palpitations. Gastrointestinal: Positive for abdominal pain (RUQ pain getting worse). Negative for constipation, diarrhea, nausea and vomiting. Endocrine: Negative. Genitourinary: Negative for decreased urine volume, difficulty urinating, dysuria, frequency and urgency. Musculoskeletal: Negative. Skin: Negative. Allergic/Immunologic: Negative. Neurological: Negative for dizziness, light-headedness and headaches. Hematological: Negative. Psychiatric/Behavioral: Negative. Objective:     Vitals:    08/01/18 1625   BP: 132/74   Site: Left Arm   Position: Sitting   Cuff Size: Large Adult   Pulse: 74   Temp: 98 °F (36.7 °C)   TempSrc: Tympanic   SpO2: 98%   Weight: 264 lb (119.7 kg)     Physical Exam   Constitutional: He is oriented to person, place, and time. He appears well-developed and well-nourished.    HENT:   Head: Normocephalic and AND PELVIS WITH CONTRAST 8/1/2018 6:38 pm TECHNIQUE: CT of the abdomen and pelvis was performed with the administration of intravenous contrast. Multiplanar reformatted images are provided for review. Dose modulation, iterative reconstruction, and/or weight based adjustment of the mA/kV was utilized to reduce the radiation dose to as low as reasonably achievable. COMPARISON: None. HISTORY: ORDERING SYSTEM PROVIDED HISTORY: Generalized abdominal pain TECHNOLOGIST PROVIDED HISTORY: Additional Contrast?->Oral Reason for exam:->Right quadrant pain Ordering Physician Provided Reason for Exam: rlq pain started this morning; denies nausea, vomiting, or diarrhea; hx of kidney stone removal Acuity: Acute Type of Exam: Initial FINDINGS: Lower Chest: Lung bases are clear without pulmonary nodules, masses, effusions, or foci of airspace disease. The base of the heart is normal in size without pericardial fluid collection. Organs: The liver, gallbladder, pancreas, and spleen are grossly within normal limits. No focal hepatic mass lesions. No intrahepatic or extrahepatic biliary ductal dilatation GI/Bowel: Bowel is without evidence for obstruction. No free intraperitoneal air or fluid noted. Small bowel loops are normal in caliber. No definite hiatal hernia. The appendix is dilated with surrounding inflammatory change and punctate appendicolith within the tip. Findings compatible with acute appendicitis. There is no evidence of perforation or abscess formation. Multiple mildly prominent right lower quadrant lymph nodes are identified. Pelvis:  No evidence for free fluid. Peritoneum/Retroperitoneum: The adrenal glands are normal in size and configuration bilaterally. Kidneys perfuse and excrete in a symmetric fashion and ureters are not obstructed. Urinary bladder is unremarkable. Bones/Soft Tissues: Osseous structures are intact without evidence for acute fracture or dislocation. No definite lytic or blastic lesions. 1     Standing Expiration Date:   8/1/2019    CBC Auto Differential     To be done in 6 months     Standing Status:   Future     Standing Expiration Date:   8/1/2019     Sal Blackburn over Ct results with patient. Advised to follow up with PCP regarding lung nodule. Patient was a previous smoker. Called ER and updated on patient and transferring. Discussed use, benefit, and side effects of prescribed medications. All patient questions answered. Pt voiced understanding. Follow up PRN.       Electronically signed by  NYASIA Martinez CNP on 8/1/2018 at 7:06 PM

## 2018-08-01 NOTE — ED TRIAGE NOTES
Patient presents to the ED with complaints of right, lower abdominal pain that started this morning. Patient comes from urgent care. In urgent care they performed blood work and a CT. CT scan showed appendix inflammation. Patient is currently rating pain a 2/10. No other questions or concerns at this time. Call light within reach.

## 2018-08-02 VITALS
WEIGHT: 260 LBS | RESPIRATION RATE: 16 BRPM | HEART RATE: 79 BPM | DIASTOLIC BLOOD PRESSURE: 62 MMHG | SYSTOLIC BLOOD PRESSURE: 125 MMHG | TEMPERATURE: 97.9 F | HEIGHT: 71 IN | OXYGEN SATURATION: 96 % | BODY MASS INDEX: 36.4 KG/M2

## 2018-08-02 RX ORDER — SODIUM CHLORIDE, SODIUM LACTATE, POTASSIUM CHLORIDE, CALCIUM CHLORIDE 600; 310; 30; 20 MG/100ML; MG/100ML; MG/100ML; MG/100ML
INJECTION, SOLUTION INTRAVENOUS CONTINUOUS
Status: DISCONTINUED | OUTPATIENT
Start: 2018-08-02 | End: 2018-09-04 | Stop reason: HOSPADM

## 2018-08-02 ASSESSMENT — PAIN SCALES - GENERAL
PAINLEVEL_OUTOF10: 0

## 2018-08-02 NOTE — ED PROVIDER NOTES
[facility] ED  [address] [facility]  Phone: [phone]  eMERGENCY dEPARTMENT eNCOUnter      Pt Name: Juan Miguel Dong  MRN: [id number]  [place] 1980  Date of evaluation: 8/1/18      CHIEF COMPLAINT       Chief Complaint   Patient presents with    Abdominal Pain     right, lower         HISTORY OF [address] is a 34 y.o. male who presents Today from urgent care for complaints of right lower quadrant pain. The patient had lab tests and a CAT scan done in urgent care and was found to have appendicitis. He was sent to the emergency Department for final disposition. The patient states that he started to have. Umbilical pain and it progressed to the right lower quadrant initially it was dull and then it progressed to a 7 or 8 out of 10. No nausea no vomiting no fevers. Denies chest pain or difficulty breathing. He denies any dysuria or urinary symptoms and denies any testicular pain or swelling. The patient has not had any belly surgeries before. He denies any allergies to medications. He drank about contrast in the CT other than that the last thing he ate was at the same which at noon which approximately 8 hours ago. In his chart it says that he has sleep apnea and that he had difficulty waking from anesthesia and 2011 he states that the anesthesia providers have been told that the last 2 times he had anesthesia and he has not had further difficulty. The patient denies needing anything for nausea or pain at this time. REVIEW OF SYSTEMS     Review of Systems   Constitutional: Negative for fever. HENT: Negative for congestion. Respiratory: Negative for shortness of breath. Cardiovascular: Negative for chest pain. Gastrointestinal: Positive for abdominal pain. Negative for vomiting. Genitourinary: Negative for difficulty urinating, scrotal swelling and testicular pain. Skin: Negative for rash. Neurological: Negative for syncope. oxygen saturation is 97%. Physical Exam   Constitutional: He is oriented to person, place, and time. He appears well-developed and well-nourished. No distress. HENT:   Head: Normocephalic. Mouth/Throat: Oropharynx is clear and moist.   Eyes: EOM are normal. Pupils are equal, round, and reactive to light. Cardiovascular: Normal rate, regular rhythm, normal heart sounds and intact distal pulses. No murmur heard. Pulmonary/Chest: Effort normal and breath sounds normal. No respiratory distress. He has no wheezes. He has no rales. Abdominal: Soft. There is tenderness. There is rebound. Positive Rovsing sign and there is tenderness that localizes to the right lower quadrant. There is a localized peritoneal signs. And rebound tenderness in the right lower quadrant. Musculoskeletal: Normal range of motion. He exhibits no edema or tenderness. Neurological: He is alert and oriented to person, place, and time. No cranial nerve deficit. Skin: Skin is warm and dry. No rash noted. He is not diaphoretic. Psychiatric: He has a normal mood and affect. His behavior is normal.   Vitals reviewed. DIFFERENTIAL DIAGNOSIS / MDM / EMERGENCY DEPARTMENT COURSE:     The history, physical exam and radiographic and laboratory evaluation all point to appendicitis. There is no signs of other acute pathology. The patient is agreeable to have surgery. The patient was made nothing by mouth. I discussed the case with Dr. Marychuy Ro the general surgeon on call who requested that the OR team be called in. Dr. Marychuy Ro came to the emergency department to evaluate the patient.     DIAGNOSTIC RESULTS     EKG: All EKG's are interpreted by the Emergency Department Physician who either signs or Co-signs this chart in the absence of a cardiologist.        RADIOLOGY:   I directly visualized the following plain film images and reviewed the radiologist interpretations of radiologic studies:    No orders to display        Ct structures are intact without evidence for acute fracture or dislocation. No definite lytic or blastic lesions. Overlying soft tissues are unremarkable. Vasculature: The abdominal aorta is normal in caliber. No discrete and aneurysm or dissection. No lymphadenopathy within the abdomen or pelvis. Findings compatible with acute appendicitis. No evidence of perforation or abscess formation. 6 mm right lower lobe pulmonary nodule. Follow-up as described below. Fleischner Society guidelines for follow-up and management of incidentally detected pulmonary nodules: Single Solid Nodule: Nodule size equals 6-8 mm In a low-risk patient, CT at 6-12 months, then consider CT at 18-24 months. In a high-risk patient, CT at 6-12 months, then CT at 18-24 months. - Low risk patients include individuals with minimal or absent history of smoking and other known risk factors. - High risk patients include individuals with a history or smoking or known risk factors. Radiology 2017 http://pubs. rsna.org/doi/full/10.1148/radiol. 0019621296         LABS:  No results found for this visit on 08/01/18. EMERGENCY DEPARTMENT COURSE:   Vitals:    Vitals:    08/01/18 1913 08/01/18 2019   BP: (!) 143/82 (!) 141/81   Pulse: 81 77   Resp: 16 16   Temp: 98.2 °F (36.8 °C)    TempSrc: Tympanic    SpO2: 98% 97%   Weight: 260 lb (117.9 kg)    Height: 5' 11\" (1.803 m)      -------------------------  BP: (!) 141/81, Temp: 98.2 °F (36.8 °C), Pulse: 77, Resp: 16      CONSULTS:  None    PROCEDURES:  None    FINAL IMPRESSION      1. Acute appendicitis with localized peritonitis          DISPOSITION/PLAN   DISPOSITION Decision To Admit 08/01/2018 08:17:41 PM      PATIENT REFERRED TO:  No follow-up provider specified.     DISCHARGE MEDICATIONS:  New Prescriptions    No medications on file         (Please note that portions of this note were completed with a voice recognition program.  Efforts were made to edit the dictations but occasionally words are mis-transcribed.)    Travis Porter MD, [facility]  Attending Emergency Medicine Physician        Travis Porter MD  08/01/18 2100

## 2018-08-02 NOTE — ANESTHESIA PRE PROCEDURE
Hyperlipidemia 2010    ON RX TIL 2014, CHANGED DIET--NOW CONTROLS HIGH CHOLESTEROL WITH DIET AND NO MEDS    Retinal detachment     LEFT    Sleep apnea 2012    C-PAP NIGHTLY    Vision abnormalities 09/2017    LEFT EYE BLURRED VISION LEADING TO OBSCURRED VISION LIKE CURTAIN COMING DOWN       Past Surgical History:        Procedure Laterality Date    EYE SURGERY Left 09/27/2017    vitrectomy, scleral buckle, gas    KIDNEY STONE SURGERY  2011    STONE REMOVAL    NERVE BLOCK      lumbar x 2 at [address] [facility] VITREOUS,SUBRET/CHOROID FLUID Left 9/27/2017    VITRECTOMY 23 GAUGE, ENDOLASER 200MW 200MS 1285 SPOTS, SCLERAL BUCKLE, AIR FLUID AIR GAS EXCHANGE WITH 16 PERCENT C3F8, PFO performed by Alejandra Otto MD at [address] Left 09/27/2017    vitrectomy, gas exchange       Social History:    Social History   Substance Use Topics    Smoking status: Former Smoker     Quit date: 9/26/1999    Smokeless tobacco: Never Used    Alcohol use Yes      Comment: BEER 3 OR 4 A WEEK                                Counseling given: Not Answered      Vital Signs (Current):   Vitals:    08/01/18 1913 08/01/18 2019   BP: (!) 143/82 (!) 141/81   Pulse: 81 77   Resp: 16 16   Temp: 36.8 °C (98.2 °F)    TempSrc: Tympanic    SpO2: 98% 97%   Weight: 260 lb (117.9 kg)    Height: 5' 11\" (1.803 m)                                               BP Readings from Last 3 Encounters:   08/01/18 (!) 141/81   08/01/18 132/74   09/27/17 (!) 141/87       NPO Status: Time of last liquid consumption: 1200                                                 Date of last liquid consumption: 08/01/18                             BMI:   Wt Readings from Last 3 Encounters:   08/01/18 260 lb (117.9 kg)   08/01/18 264 lb (119.7 kg)   09/27/17 260 lb (117.9 kg)     Body mass index is 36.26 kg/m².     CBC:   Lab Results   Component Value Date    WBC 15.0 08/01/2018    RBC 4.86 08/01/2018    HGB 14.8 08/01/2018    HCT 42.9 08/01/2018 MCV 88.2 08/01/2018    RDW 13.9 08/01/2018     08/01/2018       CMP:   Lab Results   Component Value Date     08/01/2018    K 3.9 08/01/2018    CL 99 08/01/2018    CO2 28 08/01/2018    BUN 17 08/01/2018    CREATININE 1.00 08/01/2018    GFRAA >60 08/01/2018    LABGLOM >60 08/01/2018    GLUCOSE 98 08/01/2018    PROT 7.8 08/01/2018    CALCIUM 9.4 08/01/2018    BILITOT 0.29 08/01/2018    ALKPHOS 61 08/01/2018    AST 32 08/01/2018    ALT 39 08/01/2018       POC Tests: No results for input(s): POCGLU, POCNA, POCK, POCCL, POCBUN, POCHEMO, POCHCT in the last 72 hours. Coags: No results found for: PROTIME, INR, APTT    HCG (If Applicable): No results found for: PREGTESTUR, PREGSERUM, HCG, HCGQUANT     ABGs: No results found for: PHART, PO2ART, BQZ8QPE, XNO5SFI, BEART, U9ZJSQIJ     Type & Screen (If Applicable):  No results found for: LABABO, [address]    Anesthesia Evaluation  Patient summary reviewed history of anesthetic complications (difficulty waking up; \"light weight\"): Airway: Mallampati: III  TM distance: >3 FB   Neck ROM: full  Mouth opening: > = 3 FB Dental:          Pulmonary:normal exam    (+) sleep apnea: on CPAP,  asthma:                            Cardiovascular:  Exercise tolerance: no interval change,            Beta Blocker:  Not on Beta Blocker         Neuro/Psych:   Negative Neuro/Psych ROS              GI/Hepatic/Renal:   (+) GERD: no interval change,           Endo/Other: Negative Endo/Other ROS                    Abdominal:           Vascular: negative vascular ROS. Anesthesia Plan      general     ASA 2       Induction: intravenous. MIPS: Postoperative opioids intended and Prophylactic antiemetics administered. Anesthetic plan and risks discussed with patient and spouse.       Plan discussed with surgical team.                  Aundrea Mccarty, APRN - CRNA   8/1/2018

## 2018-08-02 NOTE — ANESTHESIA POSTPROCEDURE EVALUATION
Department of Anesthesiology  Postprocedure Note    Patient: Hitesh Collier  MRN: [id number]  YOB: 1980  Date of evaluation: 8/1/2018  Time:  11:18 PM     Procedure Summary     Date:  08/01/18 Room / Location:  [address] OR    Anesthesia Start:  2156 Anesthesia Stop:  2318    Procedure:  APPENDECTOMY LAPAROSCOPIC (N/A ) Diagnosis:  (Acute Appendicitis)    Surgeon:  Gabriella Wilson MD Responsible Provider:  NYASIA Nicole CRNA    Anesthesia Type:  general ASA Status:  2          Anesthesia Type: general    Aretha Phase I:      Aretha Phase II:      Last vitals: Reviewed and per EMR flowsheets.        Anesthesia Post Evaluation    Patient location during evaluation: bedside  Patient participation: complete - patient participated  Level of consciousness: awake and alert  Pain score: 0  Airway patency: patent  Nausea & Vomiting: no vomiting and no nausea  Complications: no  Cardiovascular status: blood pressure returned to baseline  Respiratory status: acceptable and spontaneous ventilation  Hydration status: euvolemic

## 2018-08-02 NOTE — PROGRESS NOTES
Patient arrived to room at 2350 on 8/1/18. Patient alert and  Oriented. Patient talkative. Patient eating ice chips at this time. Patient voiced hunger. Vital signs taken q 15 min x 4. Vital signs stable. Patient remains afebrile. Patient continues to deny pain. Patient able to tolerate fluids without nausea/vomitting. Patient able to tolerate saltine crackers without nausea/vomitting. Patient requesting more food. Patient ate ham and cheese sandwich, peaches, and chocolate pudding. Patient tolerated well. Patient wanting to be discharged home. AVS reviewed with patient, along with discharge medication list. Patient and spouse voiced understanding. Patient left facility via wheelchair per car, wife driving. Abdominal incisions covered with bandaids, no drainage noted. Care of incisions reviewed.

## 2018-08-02 NOTE — OP NOTE
Dilia[address]                   [address] [place] [id number]                                 OPERATIVE REPORT    PATIENT NAME: Emmanuelle Hector                   :        1980  MED REC NO:   [id number]                             ROOM:  ACCOUNT NO:   [de-identified]                           ADMIT DATE: 2018  PROVIDER:     Kevin Houston    DATE OF PROCEDURE:  2018    PREOPERATIVE DIAGNOSIS:  Acute appendicitis. POSTOPERATIVE DIAGNOSIS:  Acute appendicitis. OPERATION PERFORMED:  Laparoscopic appendectomy. ANESTHESIA:  General endotracheal.    SURGEON:  Dr. Kevin Houston. OPERATIVE PROCEDURE:  The patient was brought to the operating room and  general anesthesia was induced. His abdomen was carefully prepped and  draped. Mueller catheter was placed prior to the procedure. Peritoneal  access was gained using open technique through small supraumbilical  incision with a balloon port. Once that port was inserted into the  abdomen, the abdomen was insufflated with carbon dioxide. Then under  direct vision, 5-mm ports were placed in the low midline in the right upper  quadrant. There was no fluid or pus in the abdomen, but we could see some  adhesions in the right side of the abdomen, although some of these I think  will probably congenital and not due to the appendix. But the appendix was  fairly lateral and retroperitoneal in this case, and I had to mobilize the  cecum fairly well before I could actually see it. The appendix itself was  fairly clearly inflamed. To take the appendix out, I was able to find the  base of the appendix easier than I could find the tip of it and I went  ahead and opened up the mesoappendix with a little careful dissection using  the [place] and then a DELONTE stapler was fired across the base of  the appendix.   I mobilized the appendix a little bit more in the  mesoappendix from the lateral attachments so I could elevate it and then I  was able to fire another load of the DELONTE stapler across the mesoappendix,  this time using the narrow staplers of the vascular load. The remaining  attachments then were divided again using the scissors and a little bit of  cautery in this case. The appendix was placed in an Endopouch to extract  through the umbilical port site. Pneumoperitoneum was re-established in  the space where the appendix had been, was extensively irrigated and  suctioned dry. There was a little bit of a bleeder in there, which was  controlled with the cautery. Continued to monitor this area and irrigated  multiple times to make sure we had good hemostasis. Once I satisfied the  hemostasis was good, I went ahead and irrigated and suctioned the rest of  the abdomen fairly dry. Pneumoperitoneum was then released, tried to evacuate as much of it as  possible. The umbilical port site was closed with figure-of-eight 0-Vicryl  suture and then a 0.5% bupivacaine was infiltrated and lavaged around each  incision for postoperative analgesia. The skin was closed with subcuticular 4-0 Vicryl and Steri-Strips. The  patient was awakened and transferred to recovery room in stable condition. Case classification is _____ III. Sponge, needle, and instruments counts were correct at the end of the case.         Julio Rizo    D: 08/01/2018 23:13:30       T: 08/02/2018 5:56:50     [email or web address]  Job#: [id number]     Doc#: [id number]    CC:

## 2018-08-02 NOTE — H&P
Richard Rowley is an 34 y.o.  male. Complains over right lower quadrant starting the more, getting progressively worse over the day. Pain is dull and uncomfortable, 7 of 10. Nothing makes the pain better or worse. No appetite. Feels bloated. Last ate at [facility]. No nausea or vomiting. Past Medical History:   Diagnosis Date    Anesthesia 2011    HARD TIME WAKING UP     Asthma     DX AS A CHILD--MILD INHALER US AS NEEDED    Bulging lumbar disc     MILD HAD 2 INJECTIONS TO BACK AND PHYSICAL THERAPY    Cellulitis 2002    LEFT LEG-SHIN AREA    GERD (gastroesophageal reflux disease)     HISTORY OF PT STATES RESOLVED NOW    History of kidney stones     x 1,surgical intervention required     Hyperlipidemia 2010    ON RX TIL 2014, CHANGED DIET--NOW CONTROLS HIGH CHOLESTEROL WITH DIET AND NO MEDS    Retinal detachment     LEFT    Sleep apnea 2012    C-PAP NIGHTLY    Vision abnormalities 09/2017    LEFT EYE BLURRED VISION LEADING TO OBSCURRED VISION LIKE CURTAIN COMING DOWN     Past Surgical History:   Procedure Laterality Date    EYE SURGERY Left 09/27/2017    vitrectomy, scleral buckle, gas    KIDNEY STONE SURGERY  2011    STONE REMOVAL    NERVE BLOCK      lumbar x 2 at [facility] VITREOUS,SUBRET/CHOROID FLUID Left 9/27/2017    VITRECTOMY 23 GAUGE, ENDOLASER 200MW 200MS 1285 SPOTS, SCLERAL BUCKLE, AIR FLUID AIR GAS EXCHANGE WITH 16 PERCENT C3F8, PFO performed by Chiquita Gutierrez MD at [address] [place] [address] Road Left 09/27/2017    vitrectomy, gas exchange     No current facility-administered medications on file prior to encounter.       Current Outpatient Prescriptions on File Prior to Encounter   Medication Sig Dispense Refill    albuterol sulfate  (90 Base) MCG/ACT inhaler Inhale 2 puffs into the lungs every 6 hours as needed for Wheezing      ibuprofen (ADVIL;MOTRIN) 200 MG tablet Take 400 mg by mouth every 6 hours as needed for Pain      Naproxen Sodium (ALEVE) 220 MG CAPS Take

## 2018-08-03 LAB — SURGICAL PATHOLOGY REPORT: NORMAL

## 2018-08-09 ENCOUNTER — TELEPHONE (OUTPATIENT)
Dept: PRIMARY CARE CLINIC | Age: 38
End: 2018-08-09

## (undated) DEVICE — 3M™ TEGADERM™ TRANSPARENT FILM DRESSING FRAME STYLE, 1626W, 4 IN X 4-3/4 IN (10 CM X 12 CM), 50/CT 4CT/CASE: Brand: 3M™ TEGADERM™

## (undated) DEVICE — MICROSURGICAL INSTRUMENT 23GA SOFT TIP CANNULA, DSP, 0.8MM: Brand: ALCON

## (undated) DEVICE — GLOVE SURG SZ 8 L12IN THK91MIL BRN LTX FREE POLYCHLOROPRENE

## (undated) DEVICE — Z INACTIVE USE 2660664 SOLUTION IRRIG 3000ML 0.9% SOD CHL USP UROMATIC PLAS CONT

## (undated) DEVICE — INSTRUMENT WARMER: Brand: SCOPE WARMER DISPOSABLE SEAL

## (undated) DEVICE — PREP SOL PVP IODINE 4%  4 OZ/BTL

## (undated) DEVICE — NEEDLE HYPO 30GA L0.5IN BGE POLYPR HUB S STL REG BVL STR

## (undated) DEVICE — CONVERTED USE 394383 SPONGE EYE SPEAR

## (undated) DEVICE — NEEDLE FLTR 18GA L1.5IN MEM THK5UM BLNT DISP

## (undated) DEVICE — 60 ML SYRINGE LUER-LOCK TIP: Brand: MONOJECT

## (undated) DEVICE — STRIP SKIN CLSR W0.25XL4IN WHT SPUNBOUND FBR NYL HI ADH

## (undated) DEVICE — STANDARD HYPODERMIC NEEDLE,POLYPROPYLENE HUB: Brand: MONOJECT

## (undated) DEVICE — BLADE CLIPPER GEN PURP NS

## (undated) DEVICE — HYPODERMIC SAFETY NEEDLE: Brand: MAGELLAN

## (undated) DEVICE — SOLUTION IRRIGATION BAL SALT SOLUTION 500 ML BTL 6/CA BSS +

## (undated) DEVICE — 3M™ WARMING BLANKET, UPPER BODY, 10 PER CASE, 42268: Brand: BAIR HUGGER™

## (undated) DEVICE — NEEDLE OPHTH 25GA L15IN RETROBLB TIP

## (undated) DEVICE — 3000CC GUARDIAN II: Brand: GUARDIAN

## (undated) DEVICE — 23 GA WIDE ANGLE ENDOILLUMINATOR: Brand: ENGAUGE

## (undated) DEVICE — DUAL BORE CANNULA 23G: Brand: MEDONE

## (undated) DEVICE — GARMENT COMPR STD FOR 17IN CALF UNIF THER FLOTRN

## (undated) DEVICE — SYRINGE, LUER LOCK, 10ML: Brand: MEDLINE

## (undated) DEVICE — INTENDED FOR TISSUE SEPARATION, AND OTHER PROCEDURES THAT REQUIRE A SHARP SURGICAL BLADE TO PUNCTURE OR CUT.: Brand: BARD-PARKER ® CARBON RIB-BACK BLADES

## (undated) DEVICE — TROCARS: Brand: KII® BALLOON BLUNT TIP SYSTEM

## (undated) DEVICE — SCISSOR SURG CRV ENDOCUT TIP FOR LAP DISP

## (undated) DEVICE — CAUTERY SURG 23GA BPLR 6 PER BX

## (undated) DEVICE — GLOVE SURG SZ 65 THK91MIL LTX FREE SYN POLYISOPRENE

## (undated) DEVICE — SOLUTION SURG PREP POV IOD 7.5% 4 OZ

## (undated) DEVICE — DISCONTINUED USE 435154 INVENTORY EXISTS CSFILTER SYRINGE BL ST SLGS033SS (50/BX)

## (undated) DEVICE — 9165 UNIVERSAL PATIENT PLATE: Brand: 3M™

## (undated) DEVICE — TRAY URIN CATH 16FR DRNGE BG STATLOK STBL DEV F SURSTP

## (undated) DEVICE — CHLORAPREP 26ML ORANGE

## (undated) DEVICE — CONSTELLATION VISION SYSTEM 5000 CPM ULTRAVIT PROBE STRAIGHT ENDOILLUMINATOR 23 GA TOTALPLUS VITRECTOMY PAK ENGAUGE RFID: Brand: CONSTELLATION, ULTRAVIT, TOTALPLUS, ENGAUGE

## (undated) DEVICE — RETINA PK

## (undated) DEVICE — 1 ML TUBERCULIN SYRINGE LUER-LOCK TIP: Brand: MONOJECT

## (undated) DEVICE — CUTTER ENDOSCP L340MM LIN ARTC SGL STROKE FIRING ENDOPATH

## (undated) DEVICE — 3 ML SYRINGE LUER-LOCK TIP: Brand: MONOJECT

## (undated) DEVICE — COVER LT HNDL BLU PLAS

## (undated) DEVICE — GOWN,AURORA,NON-REINFORCED,2XL: Brand: MEDLINE

## (undated) DEVICE — 1 EACH 40411 STERILE DISPOSABLE SUPER VIEW® LENS SET & 1 EACH 40100 STERILE MICROSCOPE DRAPE: Brand: SUPER VIEW® PACK

## (undated) DEVICE — TROCAR: Brand: KII® SLEEVE

## (undated) DEVICE — 23 GA VALVED TROCAR CANNULA ENTRY SYSTEM, 1 CT: Brand: ALCON

## (undated) DEVICE — LAP CHOLE PK

## (undated) DEVICE — SOLUTION: ACTIFOG W/FOAM PAD 48/CS: Brand: ACTIFOG™

## (undated) DEVICE — SUTURE VCRL SZ 0 L27IN ABSRB VLT L26MM CT-2 1/2 CIR J334H

## (undated) DEVICE — GAUZE,SPONGE,2"X2",8PLY,STERILE,LF,2'S: Brand: MEDLINE

## (undated) DEVICE — BAG SPEC LAP H6IN DIA3IN 250ML 10 12MM CANN ATTCH MEM WIRE

## (undated) DEVICE — SOLUTION IRRIG 1000ML PENTALYTE PLAS POUR BTL TIS U SOL

## (undated) DEVICE — SUTURE COAT VCRL SZ 4-0 L18IN ABSRB UD L16MM PC-3 3/8 CIR J845G

## (undated) DEVICE — AGENT VISCOELASTIC 1ML 2% HYDROXYPROPYL METHCELL PRELD GLS

## (undated) DEVICE — GOWN,AURORA,NONREINFORCED,LARGE: Brand: MEDLINE

## (undated) DEVICE — PROBE OPHTH LSR FLEX NIT TAPR TIP ILLUMINATED MIDFIELD LT

## (undated) DEVICE — PACK SURG PROC REMINDER N WVN DISPOSABLE BEAC TIME OUT